# Patient Record
Sex: MALE | Race: WHITE | NOT HISPANIC OR LATINO | Employment: PART TIME | URBAN - METROPOLITAN AREA
[De-identification: names, ages, dates, MRNs, and addresses within clinical notes are randomized per-mention and may not be internally consistent; named-entity substitution may affect disease eponyms.]

---

## 2017-03-27 ENCOUNTER — APPOINTMENT (OUTPATIENT)
Dept: GENERAL RADIOLOGY | Age: 67
End: 2017-03-27
Attending: EMERGENCY MEDICINE

## 2017-03-27 ENCOUNTER — HOSPITAL ENCOUNTER (EMERGENCY)
Age: 67
Discharge: ANOTHER HOSPITAL | End: 2017-03-27
Attending: EMERGENCY MEDICINE

## 2017-03-27 VITALS
RESPIRATION RATE: 14 BRPM | WEIGHT: 198.85 LBS | SYSTOLIC BLOOD PRESSURE: 100 MMHG | TEMPERATURE: 97.1 F | OXYGEN SATURATION: 95 % | HEIGHT: 72 IN | BODY MASS INDEX: 26.93 KG/M2 | HEART RATE: 97 BPM | DIASTOLIC BLOOD PRESSURE: 62 MMHG

## 2017-03-27 DIAGNOSIS — S72.001A CLOSED RIGHT HIP FRACTURE, INITIAL ENCOUNTER (CMD): Primary | ICD-10-CM

## 2017-03-27 LAB
ABO + RH BLD: NORMAL
ANION GAP SERPL CALC-SCNC: 14 MMOL/L (ref 10–20)
BASOPHILS # BLD AUTO: 0 K/MCL (ref 0–0.3)
BASOPHILS NFR BLD AUTO: 0 %
BLD GP AB SCN SERPL QL: NEGATIVE
BLOOD BANK CMNT PATIENT-IMP: NORMAL
BUN SERPL-MCNC: 12 MG/DL (ref 6–20)
BUN/CREAT SERPL: 14 (ref 7–25)
CALCIUM SERPL-MCNC: 9.2 MG/DL (ref 8.4–10.2)
CHLORIDE SERPL-SCNC: 99 MMOL/L (ref 98–107)
CO2 SERPL-SCNC: 25 MMOL/L (ref 21–32)
CREAT SERPL-MCNC: 0.83 MG/DL (ref 0.67–1.17)
CROSSMATCH EXPIRE: NORMAL
DIFFERENTIAL METHOD BLD: ABNORMAL
EOSINOPHIL # BLD AUTO: 0 K/MCL (ref 0.1–0.5)
EOSINOPHIL NFR SPEC: 1 %
ERYTHROCYTE [DISTWIDTH] IN BLOOD: 14.5 % (ref 11–15)
ETHANOL SERPL-MCNC: 181 MG/DL
GLUCOSE SERPL-MCNC: 111 MG/DL (ref 65–99)
HCT VFR BLD CALC: 41.4 % (ref 39–51)
HGB BLD-MCNC: 14 G/DL (ref 13–17)
LYMPHOCYTES # BLD MANUAL: 1.7 K/MCL (ref 1–4)
LYMPHOCYTES NFR BLD MANUAL: 21 %
MCH RBC QN AUTO: 30.5 PG (ref 26–34)
MCHC RBC AUTO-ENTMCNC: 33.8 G/DL (ref 32–36.5)
MCV RBC AUTO: 90.2 FL (ref 78–100)
MONOCYTES # BLD MANUAL: 0.5 K/MCL (ref 0.3–0.9)
MONOCYTES NFR BLD MANUAL: 6 %
NEUTROPHILS # BLD AUTO: 6 K/MCL (ref 1.8–7.7)
NEUTROPHILS NFR BLD AUTO: 72 %
PLATELET # BLD: 307 K/MCL (ref 140–450)
POTASSIUM SERPL-SCNC: 4.2 MMOL/L (ref 3.4–5.1)
RBC # BLD: 4.59 MIL/MCL (ref 4.5–5.9)
SODIUM SERPL-SCNC: 134 MMOL/L (ref 135–145)
WBC # BLD: 8.2 K/MCL (ref 4.2–11)

## 2017-03-27 PROCEDURE — 99284 EMERGENCY DEPT VISIT MOD MDM: CPT

## 2017-03-27 PROCEDURE — 10002800 HB RX 250 W HCPCS: Performed by: EMERGENCY MEDICINE

## 2017-03-27 PROCEDURE — 96376 TX/PRO/DX INJ SAME DRUG ADON: CPT

## 2017-03-27 PROCEDURE — 86901 BLOOD TYPING SEROLOGIC RH(D): CPT

## 2017-03-27 PROCEDURE — 80320 DRUG SCREEN QUANTALCOHOLS: CPT

## 2017-03-27 PROCEDURE — 85025 COMPLETE CBC W/AUTO DIFF WBC: CPT

## 2017-03-27 PROCEDURE — 36415 COLL VENOUS BLD VENIPUNCTURE: CPT

## 2017-03-27 PROCEDURE — 72170 X-RAY EXAM OF PELVIS: CPT

## 2017-03-27 PROCEDURE — 73502 X-RAY EXAM HIP UNI 2-3 VIEWS: CPT | Performed by: RADIOLOGY

## 2017-03-27 PROCEDURE — 73502 X-RAY EXAM HIP UNI 2-3 VIEWS: CPT

## 2017-03-27 PROCEDURE — 80048 BASIC METABOLIC PNL TOTAL CA: CPT

## 2017-03-27 PROCEDURE — 96374 THER/PROPH/DIAG INJ IV PUSH: CPT

## 2017-03-27 RX ORDER — HYDROMORPHONE HYDROCHLORIDE 1 MG/ML
1 INJECTION, SOLUTION INTRAMUSCULAR; INTRAVENOUS; SUBCUTANEOUS EVERY 30 MIN PRN
Status: DISCONTINUED | OUTPATIENT
Start: 2017-03-27 | End: 2017-03-27 | Stop reason: HOSPADM

## 2017-03-27 RX ORDER — HYDROMORPHONE HYDROCHLORIDE 1 MG/ML
0.5 INJECTION, SOLUTION INTRAMUSCULAR; INTRAVENOUS; SUBCUTANEOUS EVERY 30 MIN PRN
Status: DISCONTINUED | OUTPATIENT
Start: 2017-03-27 | End: 2017-03-27

## 2017-03-27 RX ADMIN — HYDROMORPHONE HYDROCHLORIDE 1 MG: 1 INJECTION, SOLUTION INTRAMUSCULAR; INTRAVENOUS; SUBCUTANEOUS at 05:07

## 2017-03-27 RX ADMIN — HYDROMORPHONE HYDROCHLORIDE 0.5 MG: 1 INJECTION, SOLUTION INTRAMUSCULAR; INTRAVENOUS; SUBCUTANEOUS at 04:13

## 2017-03-27 ASSESSMENT — PAIN SCALES - GENERAL
PAIN_LEVEL_AT_REST: 5
PAINLEVEL_OUTOF10: 3
PAIN_LEVEL_AT_REST: 6
PAINLEVEL_OUTOF10: 5

## 2017-03-27 ASSESSMENT — ENCOUNTER SYMPTOMS
FEVER: 0
VOMITING: 0
ABDOMINAL PAIN: 0
HEADACHES: 0
NAUSEA: 0
WEAKNESS: 0
SORE THROAT: 0
BACK PAIN: 0
SHORTNESS OF BREATH: 0
SLEEP DISTURBANCE: 0
DIARRHEA: 0

## 2020-06-01 ENCOUNTER — LAB REQUISITION (OUTPATIENT)
Dept: LAB | Age: 70
End: 2020-06-01

## 2020-06-01 DIAGNOSIS — I10 ESSENTIAL (PRIMARY) HYPERTENSION: ICD-10-CM

## 2020-06-01 PROCEDURE — 82043 UR ALBUMIN QUANTITATIVE: CPT | Performed by: CLINICAL MEDICAL LABORATORY

## 2020-06-01 PROCEDURE — 82570 ASSAY OF URINE CREATININE: CPT | Performed by: CLINICAL MEDICAL LABORATORY

## 2020-06-01 PROCEDURE — PSEU8567 MICROALBUMIN URINE RANDOM: Performed by: CLINICAL MEDICAL LABORATORY

## 2020-06-02 LAB
CREAT UR-MCNC: 174 MG/DL
MICROALBUMIN UR-MCNC: 0.73 MCG/MIN
MICROALBUMIN/CREAT UR: 4.2 MG/G

## 2021-01-11 ENCOUNTER — APPOINTMENT (OUTPATIENT)
Dept: CT IMAGING | Facility: CLINIC | Age: 71
End: 2021-01-11
Attending: EMERGENCY MEDICINE
Payer: MEDICARE

## 2021-01-11 ENCOUNTER — HOSPITAL ENCOUNTER (OUTPATIENT)
Facility: CLINIC | Age: 71
Setting detail: OBSERVATION
Discharge: HOME | End: 2021-01-12
Attending: EMERGENCY MEDICINE | Admitting: INTERNAL MEDICINE
Payer: MEDICARE

## 2021-01-11 DIAGNOSIS — I10 BENIGN ESSENTIAL HYPERTENSION: Primary | ICD-10-CM

## 2021-01-11 DIAGNOSIS — D64.9 ANEMIA, UNSPECIFIED TYPE: ICD-10-CM

## 2021-01-11 DIAGNOSIS — R55 NEAR SYNCOPE: ICD-10-CM

## 2021-01-11 DIAGNOSIS — I95.89 OTHER SPECIFIED HYPOTENSION: ICD-10-CM

## 2021-01-11 LAB
ALBUMIN SERPL-MCNC: 2.9 G/DL (ref 3.4–5)
ALP SERPL-CCNC: 103 U/L (ref 40–150)
ALT SERPL W P-5'-P-CCNC: 20 U/L (ref 0–70)
ANION GAP SERPL CALCULATED.3IONS-SCNC: 5 MMOL/L (ref 3–14)
AST SERPL W P-5'-P-CCNC: 13 U/L (ref 0–45)
BASOPHILS # BLD AUTO: 0.1 10E9/L (ref 0–0.2)
BASOPHILS NFR BLD AUTO: 0.7 %
BILIRUB SERPL-MCNC: 0.4 MG/DL (ref 0.2–1.3)
BUN SERPL-MCNC: 17 MG/DL (ref 7–30)
CALCIUM SERPL-MCNC: 8.6 MG/DL (ref 8.5–10.1)
CHLORIDE SERPL-SCNC: 108 MMOL/L (ref 94–109)
CO2 BLDCOV-SCNC: 22 MMOL/L (ref 21–28)
CO2 SERPL-SCNC: 24 MMOL/L (ref 20–32)
CREAT BLD-MCNC: 1.1 MG/DL (ref 0.66–1.25)
CREAT SERPL-MCNC: 1.1 MG/DL (ref 0.66–1.25)
D DIMER PPP FEU-MCNC: 0.5 UG/ML FEU (ref 0–0.5)
DIFFERENTIAL METHOD BLD: ABNORMAL
EOSINOPHIL # BLD AUTO: 0.2 10E9/L (ref 0–0.7)
EOSINOPHIL NFR BLD AUTO: 2.6 %
ERYTHROCYTE [DISTWIDTH] IN BLOOD BY AUTOMATED COUNT: 14.4 % (ref 10–15)
GFR SERPL CREATININE-BSD FRML MDRD: 66 ML/MIN/{1.73_M2}
GFR SERPL CREATININE-BSD FRML MDRD: 68 ML/MIN/{1.73_M2}
GLUCOSE SERPL-MCNC: 158 MG/DL (ref 70–99)
HCT VFR BLD AUTO: 39 % (ref 40–53)
HCT VFR BLD CALC: 37 %PCV (ref 40–53)
HGB BLD CALC-MCNC: 12.6 G/DL (ref 13.3–17.7)
HGB BLD-MCNC: 12.1 G/DL (ref 13.3–17.7)
IMM GRANULOCYTES # BLD: 0.1 10E9/L (ref 0–0.4)
IMM GRANULOCYTES NFR BLD: 0.7 %
INTERPRETATION ECG - MUSE: NORMAL
LABORATORY COMMENT REPORT: NORMAL
LACTATE BLD-SCNC: 2 MMOL/L (ref 0.7–2)
LYMPHOCYTES # BLD AUTO: 1.2 10E9/L (ref 0.8–5.3)
LYMPHOCYTES NFR BLD AUTO: 15.1 %
MAGNESIUM SERPL-MCNC: 2.1 MG/DL (ref 1.6–2.3)
MCH RBC QN AUTO: 27.6 PG (ref 26.5–33)
MCHC RBC AUTO-ENTMCNC: 31 G/DL (ref 31.5–36.5)
MCV RBC AUTO: 89 FL (ref 78–100)
MONOCYTES # BLD AUTO: 0.6 10E9/L (ref 0–1.3)
MONOCYTES NFR BLD AUTO: 7.6 %
NEUTROPHILS # BLD AUTO: 6 10E9/L (ref 1.6–8.3)
NEUTROPHILS NFR BLD AUTO: 73.3 %
NRBC # BLD AUTO: 0 10*3/UL
NRBC BLD AUTO-RTO: 0 /100
NT-PROBNP SERPL-MCNC: 170 PG/ML (ref 0–900)
PCO2 BLDV: 43 MM HG (ref 40–50)
PH BLDV: 7.31 PH (ref 7.32–7.43)
PLATELET # BLD AUTO: 398 10E9/L (ref 150–450)
PO2 BLDV: 29 MM HG (ref 25–47)
POTASSIUM BLD-SCNC: 4.3 MMOL/L (ref 3.4–5.3)
POTASSIUM SERPL-SCNC: 4.2 MMOL/L (ref 3.4–5.3)
PROT SERPL-MCNC: 6.7 G/DL (ref 6.8–8.8)
RBC # BLD AUTO: 4.38 10E12/L (ref 4.4–5.9)
SAO2 % BLDV FROM PO2: 50 %
SARS-COV-2 RNA RESP QL NAA+PROBE: NEGATIVE
SODIUM BLD-SCNC: 139 MMOL/L (ref 133–144)
SODIUM SERPL-SCNC: 137 MMOL/L (ref 133–144)
SPECIMEN SOURCE: NORMAL
TROPONIN I SERPL-MCNC: <0.015 UG/L (ref 0–0.04)
TSH SERPL DL<=0.005 MIU/L-ACNC: 2.97 MU/L (ref 0.4–4)
WBC # BLD AUTO: 8.1 10E9/L (ref 4–11)

## 2021-01-11 PROCEDURE — 93005 ELECTROCARDIOGRAM TRACING: CPT

## 2021-01-11 PROCEDURE — 250N000011 HC RX IP 250 OP 636: Performed by: EMERGENCY MEDICINE

## 2021-01-11 PROCEDURE — 87635 SARS-COV-2 COVID-19 AMP PRB: CPT | Performed by: EMERGENCY MEDICINE

## 2021-01-11 PROCEDURE — 999N001079 HC STATISTIC HEMATOCRIT ED POCT

## 2021-01-11 PROCEDURE — 250N000013 HC RX MED GY IP 250 OP 250 PS 637: Performed by: PHYSICIAN ASSISTANT

## 2021-01-11 PROCEDURE — 70450 CT HEAD/BRAIN W/O DYE: CPT

## 2021-01-11 PROCEDURE — 83880 ASSAY OF NATRIURETIC PEPTIDE: CPT | Mod: GZ | Performed by: EMERGENCY MEDICINE

## 2021-01-11 PROCEDURE — 96360 HYDRATION IV INFUSION INIT: CPT

## 2021-01-11 PROCEDURE — 80053 COMPREHEN METABOLIC PANEL: CPT | Performed by: EMERGENCY MEDICINE

## 2021-01-11 PROCEDURE — 84443 ASSAY THYROID STIM HORMONE: CPT | Performed by: EMERGENCY MEDICINE

## 2021-01-11 PROCEDURE — 250N000009 HC RX 250: Performed by: EMERGENCY MEDICINE

## 2021-01-11 PROCEDURE — 85379 FIBRIN DEGRADATION QUANT: CPT | Performed by: EMERGENCY MEDICINE

## 2021-01-11 PROCEDURE — G0378 HOSPITAL OBSERVATION PER HR: HCPCS

## 2021-01-11 PROCEDURE — 83735 ASSAY OF MAGNESIUM: CPT | Performed by: EMERGENCY MEDICINE

## 2021-01-11 PROCEDURE — 36415 COLL VENOUS BLD VENIPUNCTURE: CPT | Performed by: PHYSICIAN ASSISTANT

## 2021-01-11 PROCEDURE — 82565 ASSAY OF CREATININE: CPT | Mod: 91

## 2021-01-11 PROCEDURE — 999N001077 HC STATISTIC POTASSIUM ED POCT

## 2021-01-11 PROCEDURE — 84484 ASSAY OF TROPONIN QUANT: CPT | Performed by: PHYSICIAN ASSISTANT

## 2021-01-11 PROCEDURE — 84484 ASSAY OF TROPONIN QUANT: CPT | Performed by: EMERGENCY MEDICINE

## 2021-01-11 PROCEDURE — 250N000013 HC RX MED GY IP 250 OP 250 PS 637: Performed by: INTERNAL MEDICINE

## 2021-01-11 PROCEDURE — 96361 HYDRATE IV INFUSION ADD-ON: CPT

## 2021-01-11 PROCEDURE — 999N001076 HC STATISTIC SODIUM ED POCT

## 2021-01-11 PROCEDURE — 99219 PR INITIAL OBSERVATION CARE,LEVEL II: CPT | Performed by: PHYSICIAN ASSISTANT

## 2021-01-11 PROCEDURE — 99291 CRITICAL CARE FIRST HOUR: CPT | Mod: 25

## 2021-01-11 PROCEDURE — 258N000003 HC RX IP 258 OP 636: Performed by: PHYSICIAN ASSISTANT

## 2021-01-11 PROCEDURE — 83605 ASSAY OF LACTIC ACID: CPT | Performed by: EMERGENCY MEDICINE

## 2021-01-11 PROCEDURE — 82803 BLOOD GASES ANY COMBINATION: CPT

## 2021-01-11 PROCEDURE — 85025 COMPLETE CBC W/AUTO DIFF WBC: CPT | Performed by: EMERGENCY MEDICINE

## 2021-01-11 PROCEDURE — C9803 HOPD COVID-19 SPEC COLLECT: HCPCS

## 2021-01-11 PROCEDURE — 258N000003 HC RX IP 258 OP 636: Performed by: EMERGENCY MEDICINE

## 2021-01-11 PROCEDURE — 71275 CT ANGIOGRAPHY CHEST: CPT

## 2021-01-11 RX ORDER — ALBUTEROL SULFATE 90 UG/1
2 AEROSOL, METERED RESPIRATORY (INHALATION) EVERY 4 HOURS PRN
COMMUNITY

## 2021-01-11 RX ORDER — VENLAFAXINE HYDROCHLORIDE 150 MG/1
150 CAPSULE, EXTENDED RELEASE ORAL DAILY
Status: DISCONTINUED | OUTPATIENT
Start: 2021-01-11 | End: 2021-01-12 | Stop reason: HOSPADM

## 2021-01-11 RX ORDER — TAMSULOSIN HYDROCHLORIDE 0.4 MG/1
0.4 CAPSULE ORAL EVERY EVENING
COMMUNITY

## 2021-01-11 RX ORDER — TOPIRAMATE SPINKLE 25 MG/1
25 CAPSULE ORAL DAILY
Status: DISCONTINUED | OUTPATIENT
Start: 2021-01-11 | End: 2021-01-11

## 2021-01-11 RX ORDER — NITROGLYCERIN 0.4 MG/1
0.4 TABLET SUBLINGUAL EVERY 5 MIN PRN
Status: DISCONTINUED | OUTPATIENT
Start: 2021-01-11 | End: 2021-01-12 | Stop reason: HOSPADM

## 2021-01-11 RX ORDER — SULFAMETHOXAZOLE/TRIMETHOPRIM 800-160 MG
1 TABLET ORAL DAILY
Status: DISCONTINUED | OUTPATIENT
Start: 2021-01-11 | End: 2021-01-12 | Stop reason: HOSPADM

## 2021-01-11 RX ORDER — FEXOFENADINE HCL 60 MG/1
30 TABLET, FILM COATED ORAL DAILY
COMMUNITY

## 2021-01-11 RX ORDER — TAMSULOSIN HYDROCHLORIDE 0.4 MG/1
0.4 CAPSULE ORAL EVERY EVENING
Status: DISCONTINUED | OUTPATIENT
Start: 2021-01-11 | End: 2021-01-12 | Stop reason: HOSPADM

## 2021-01-11 RX ORDER — SULFAMETHOXAZOLE/TRIMETHOPRIM 800-160 MG
1 TABLET ORAL DAILY
COMMUNITY

## 2021-01-11 RX ORDER — IMIPRAMINE HCL 25 MG
100 TABLET ORAL AT BEDTIME
Status: DISCONTINUED | OUTPATIENT
Start: 2021-01-11 | End: 2021-01-12 | Stop reason: HOSPADM

## 2021-01-11 RX ORDER — VENLAFAXINE HYDROCHLORIDE 75 MG/1
150 CAPSULE, EXTENDED RELEASE ORAL DAILY
COMMUNITY

## 2021-01-11 RX ORDER — OMEPRAZOLE 40 MG/1
40 CAPSULE, DELAYED RELEASE ORAL
COMMUNITY

## 2021-01-11 RX ORDER — LIDOCAINE 40 MG/G
CREAM TOPICAL
Status: DISCONTINUED | OUTPATIENT
Start: 2021-01-11 | End: 2021-01-12 | Stop reason: HOSPADM

## 2021-01-11 RX ORDER — IBUPROFEN 200 MG
200 TABLET ORAL EVERY 6 HOURS PRN
Status: DISCONTINUED | OUTPATIENT
Start: 2021-01-11 | End: 2021-01-12 | Stop reason: HOSPADM

## 2021-01-11 RX ORDER — ATORVASTATIN CALCIUM 40 MG/1
40 TABLET, FILM COATED ORAL AT BEDTIME
COMMUNITY

## 2021-01-11 RX ORDER — AMOXICILLIN 250 MG
1 CAPSULE ORAL 2 TIMES DAILY PRN
COMMUNITY

## 2021-01-11 RX ORDER — IMIPRAMINE HCL 50 MG
100 TABLET ORAL AT BEDTIME
COMMUNITY

## 2021-01-11 RX ORDER — FLUTICASONE PROPIONATE 50 MCG
1 SPRAY, SUSPENSION (ML) NASAL DAILY
COMMUNITY

## 2021-01-11 RX ORDER — ATENOLOL 25 MG/1
25 TABLET ORAL DAILY
Status: DISCONTINUED | OUTPATIENT
Start: 2021-01-11 | End: 2021-01-12 | Stop reason: HOSPADM

## 2021-01-11 RX ORDER — ALBUTEROL SULFATE 90 UG/1
2 AEROSOL, METERED RESPIRATORY (INHALATION) EVERY 4 HOURS PRN
Status: DISCONTINUED | OUTPATIENT
Start: 2021-01-11 | End: 2021-01-12 | Stop reason: HOSPADM

## 2021-01-11 RX ORDER — ONDANSETRON 2 MG/ML
4 INJECTION INTRAMUSCULAR; INTRAVENOUS EVERY 6 HOURS PRN
Status: DISCONTINUED | OUTPATIENT
Start: 2021-01-11 | End: 2021-01-12 | Stop reason: HOSPADM

## 2021-01-11 RX ORDER — TOPIRAMATE 25 MG/1
25 TABLET, FILM COATED ORAL DAILY
Status: DISCONTINUED | OUTPATIENT
Start: 2021-01-11 | End: 2021-01-12 | Stop reason: HOSPADM

## 2021-01-11 RX ORDER — LIDOCAINE 4 G/G
1 PATCH TOPICAL DAILY PRN
COMMUNITY

## 2021-01-11 RX ORDER — SODIUM CHLORIDE 9 MG/ML
INJECTION, SOLUTION INTRAVENOUS CONTINUOUS
Status: DISCONTINUED | OUTPATIENT
Start: 2021-01-11 | End: 2021-01-12 | Stop reason: HOSPADM

## 2021-01-11 RX ORDER — ATENOLOL 25 MG/1
25 TABLET ORAL DAILY
Status: ON HOLD | COMMUNITY
End: 2021-01-12

## 2021-01-11 RX ORDER — ASPIRIN 81 MG/1
81 TABLET ORAL DAILY
Status: DISCONTINUED | OUTPATIENT
Start: 2021-01-11 | End: 2021-01-12 | Stop reason: HOSPADM

## 2021-01-11 RX ORDER — ONDANSETRON 4 MG/1
4 TABLET, ORALLY DISINTEGRATING ORAL EVERY 6 HOURS PRN
Status: DISCONTINUED | OUTPATIENT
Start: 2021-01-11 | End: 2021-01-12 | Stop reason: HOSPADM

## 2021-01-11 RX ORDER — IBUPROFEN 200 MG
200 TABLET ORAL EVERY 6 HOURS PRN
COMMUNITY

## 2021-01-11 RX ORDER — ACETAMINOPHEN 650 MG/1
650 SUPPOSITORY RECTAL EVERY 4 HOURS PRN
Status: DISCONTINUED | OUTPATIENT
Start: 2021-01-11 | End: 2021-01-12 | Stop reason: HOSPADM

## 2021-01-11 RX ORDER — ASPIRIN 81 MG/1
81 TABLET ORAL DAILY
COMMUNITY

## 2021-01-11 RX ORDER — TOPIRAMATE SPINKLE 25 MG/1
25 CAPSULE ORAL DAILY
COMMUNITY

## 2021-01-11 RX ORDER — IOPAMIDOL 755 MG/ML
68 INJECTION, SOLUTION INTRAVASCULAR ONCE
Status: COMPLETED | OUTPATIENT
Start: 2021-01-11 | End: 2021-01-11

## 2021-01-11 RX ORDER — BETAMETHASONE DIPROPIONATE 0.5 MG/G
LOTION TOPICAL DAILY PRN
COMMUNITY

## 2021-01-11 RX ORDER — SODIUM CHLORIDE 9 MG/ML
INJECTION, SOLUTION INTRAVENOUS CONTINUOUS
Status: DISCONTINUED | OUTPATIENT
Start: 2021-01-11 | End: 2021-01-11

## 2021-01-11 RX ORDER — NITROGLYCERIN 0.4 MG/1
0.4 TABLET SUBLINGUAL EVERY 5 MIN PRN
COMMUNITY

## 2021-01-11 RX ORDER — MULTIPLE VITAMINS W/ MINERALS TAB 9MG-400MCG
1 TAB ORAL DAILY
COMMUNITY

## 2021-01-11 RX ORDER — ACETAMINOPHEN 325 MG/1
650 TABLET ORAL EVERY 4 HOURS PRN
Status: DISCONTINUED | OUTPATIENT
Start: 2021-01-11 | End: 2021-01-12 | Stop reason: HOSPADM

## 2021-01-11 RX ADMIN — IOPAMIDOL 68 ML: 755 INJECTION, SOLUTION INTRAVENOUS at 09:14

## 2021-01-11 RX ADMIN — SODIUM CHLORIDE 92 ML: 9 INJECTION, SOLUTION INTRAVENOUS at 09:14

## 2021-01-11 RX ADMIN — SODIUM CHLORIDE 1000 ML: 9 INJECTION, SOLUTION INTRAVENOUS at 09:02

## 2021-01-11 RX ADMIN — SODIUM CHLORIDE: 9 INJECTION, SOLUTION INTRAVENOUS at 13:32

## 2021-01-11 RX ADMIN — IMIPRAMINE HYDROCHLORIDE 100 MG: 25 TABLET ORAL at 21:45

## 2021-01-11 RX ADMIN — TOPIRAMATE 25 MG: 25 TABLET, FILM COATED ORAL at 14:54

## 2021-01-11 RX ADMIN — TAMSULOSIN HYDROCHLORIDE 0.4 MG: 0.4 CAPSULE ORAL at 19:50

## 2021-01-11 RX ADMIN — SODIUM CHLORIDE: 9 INJECTION, SOLUTION INTRAVENOUS at 23:57

## 2021-01-11 RX ADMIN — VENLAFAXINE HYDROCHLORIDE 150 MG: 150 CAPSULE, EXTENDED RELEASE ORAL at 13:33

## 2021-01-11 RX ADMIN — SULFAMETHOXAZOLE AND TRIMETHOPRIM 1 TABLET: 800; 160 TABLET ORAL at 13:33

## 2021-01-11 ASSESSMENT — ENCOUNTER SYMPTOMS
WEAKNESS: 1
HEADACHES: 1
NAUSEA: 0
BLOOD IN STOOL: 0
LIGHT-HEADEDNESS: 1
SHORTNESS OF BREATH: 0
FEVER: 0
ABDOMINAL PAIN: 0
DIARRHEA: 0
CHILLS: 0
COUGH: 0
DIAPHORESIS: 0

## 2021-01-11 ASSESSMENT — MIFFLIN-ST. JEOR
SCORE: 1635.35
SCORE: 1618.88

## 2021-01-11 NOTE — PHARMACY-ADMISSION MEDICATION HISTORY
Pharmacy Medication History  Admission medication history interview status for the 1/11/2021  admission is complete. See EPIC admission navigator for prior to admission medications       Medication history sources: Patient, [facility] everywhere  Location of interview: Phone  Adherence Assessment: Good    Significant changes made to the medication list:  Added everything below      Additional medication history information:   n/a    Medication reconciliation completed by provider prior to medication history? No    Time spent in this activity: 15 min      Prior to Admission medications    Medication Sig Last Dose Taking? Auth Provider   albuterol (PROAIR HFA/PROVENTIL HFA/VENTOLIN HFA) 108 (90 Base) MCG/ACT inhaler Inhale 2 puffs into the lungs every 4 hours as needed for shortness of breath / dyspnea or wheezing prn Yes Unknown, Entered By History   aspirin 81 MG EC tablet Take 81 mg by mouth daily 1/10/2021 at Unknown time Yes Unknown, Entered By History   atenolol (TENORMIN) 25 MG tablet Take 25 mg by mouth daily For arteriosclerotic cardiovascular disease 1/10/2021 at Unknown time Yes Unknown, Entered By History   atorvastatin (LIPITOR) 40 MG tablet Take 40 mg by mouth At Bedtime 1/10/2021 at Unknown time Yes Unknown, Entered By History   betamethasone dipropionate (DIPROSONE) 0.05 % external lotion Apply topically daily as needed (to scalp) prn Yes Unknown, Entered By History   fexofenadine (ALLEGRA) 60 MG tablet Take 30 mg by mouth daily Allergic rhinitis 1/10/2021 at Unknown time Yes Unknown, Entered By History   fluticasone (FLONASE) 50 MCG/ACT nasal spray Spray 1 spray into both nostrils daily 1/10/2021 at Unknown time Yes Unknown, Entered By History   ibuprofen (ADVIL/MOTRIN) 200 MG tablet Take 200 mg by mouth every 6 hours as needed for mild pain prn Yes Unknown, Entered By History   imipramine (TOFRANIL) 50 MG tablet Take 100 mg by mouth At Bedtime For acquired polyneuropathy 1/10/2021 at Unknown time  Yes Unknown, Entered By History   Lidocaine (LIDOCARE) 4 % Patch Place 1 patch onto the skin daily as needed for moderate pain (to upper back) To prevent lidocaine toxicity, patient should be patch free for 12 hrs daily. prn Yes Unknown, Entered By History   multivitamin w/minerals (THERA-VIT-M) tablet Take 1 tablet by mouth daily 1/10/2021 at Unknown time Yes Unknown, Entered By History   nitroGLYcerin (NITROSTAT) 0.4 MG sublingual tablet Place 0.4 mg under the tongue every 5 minutes as needed for chest pain For chest pain place 1 tablet under the tongue every 5 minutes for 3 doses. If symptoms persist 5 minutes after 1st dose call 911. prn Yes Unknown, Entered By History   omeprazole (PRILOSEC) 40 MG DR capsule Take 40 mg by mouth daily before breakfast For GERD 1/10/2021 at Unknown time Yes Unknown, Entered By History   senna-docusate (SENOKOT-S/PERICOLACE) 8.6-50 MG tablet Take 1 tablet by mouth 2 times daily as needed for constipation prn Yes Unknown, Entered By History   sulfamethoxazole-trimethoprim (BACTRIM DS) 800-160 MG tablet Take 1 tablet by mouth daily X 30 days for acne vulgaris, starting 12/21/2020 1/10/2021 at Unknown time Yes Unknown, Entered By History   tamsulosin (FLOMAX) 0.4 MG capsule Take 0.4 mg by mouth every evening For BPH 1/10/2021 at Unknown time Yes Unknown, Entered By History   topiramate (TOPAMAX) 25 MG capsule Take 25 mg by mouth daily For acquired polyneuropathy 1/10/2021 at Unknown time Yes Unknown, Entered By History   venlafaxine (EFFEXOR-XR) 75 MG 24 hr capsule Take 150 mg by mouth daily Peripheral sensory neuyropathy 1/10/2021 at Unknown time Yes Unknown, Entered By History     Kami Sawant, PharmD      The information provided in this note is only as accurate as the sources available at the time of the update(s).

## 2021-01-11 NOTE — ED PROVIDER NOTES
History   Chief Complaint:  Lightheadedness and Weakness    HPI   Saad Renee is a 70 year old male, with a history of ASHD, pulmonary embolism (thought to be provoked after heparin initiation after bowel resection surgery), hypertension, polyneuropathy, and GERD, who presents to the ED for evaluation of lightheadedness and weakness. The patient states he has had some ongoing lightheadedness and weakness for the past three days. He states he has had some intermittent, dull chest pain, with some minor sharp episodes every so often. The patient has not felt short of breath nor has he been nauseous. The patient mentions he had a headache beginning three days ago as well, which he felt as his normal migraines. However, this morning, the patient was walking and apparently a bystander had to catch him as he was falling, therefore, EMS was contacted and the patient was brought in. En route, the paramedics state the patient had a blood pressure of 70/40s initially, and after a full liter, the patient had a blood pressure of 95/50. When they recorded on their monitor and EKG strips they found the patient to have ST depression, but no other cardiac abnormalities. He was given 324 mg aspirin prior to arrival. Here in the emergency department, the patient says he has been feeling lightheaded for a some time now, and per Epic review, was seen one week ago at his primary and had his Effexor dose lowered to 150 mg per day. He has not had any other medication or life changes. The patient denies any nausea, vomiting, or diarrhea. He has not had any abdominal pain or bloody stool. The patient has not had any fever, chills, diaphoresis, or cough. He also denies any palpitations. The patient has had a history of pulmonary embolism, but is not on long term anticoagulation.    Allergies:  Heparin  Lisinopril    Medications:   "  Aspirin  Lipitor  Atenolol  Imipramine  Prilosec  Flomax  Topiramate  Albuterol  Sennosides-docusate  Effexor  Bactrim    Past Medical History:    Myelopathy  BPH  Depression  ASHD  Dyslipidemia  Pulmonary embolism  GERD  Polyneuropathy  Hypertension  CAD    Past Surgical History:    Bilateral knee arthroplasty  Right hip arthroplasty  Left shoulder arthroplasty  Hernia repair  Bowel resection  Lumbar laminectomy    Family History:    The patient denies past family history.     Social History:  Smoking status: No  Alcohol use: Occasionally  Presents to the ED via EMS    Review of Systems   Constitutional: Negative for chills, diaphoresis and fever.   Respiratory: Negative for cough and shortness of breath.    Cardiovascular: Positive for chest pain.   Gastrointestinal: Negative for abdominal pain, blood in stool, diarrhea and nausea.   Neurological: Positive for weakness, light-headedness and headaches.   All other systems reviewed and are negative.    Physical Exam     Patient Vitals for the past 24 hrs:   BP Temp Temp src Pulse Resp SpO2 Height Weight   01/11/21 1030 122/86 -- -- 68 -- -- -- --   01/11/21 1015 120/76 -- -- 69 14 98 % -- --   01/11/21 1000 115/75 -- -- 67 16 98 % -- --   01/11/21 0946 -- -- -- 66 16 98 % -- --   01/11/21 0945 111/77 -- -- 67 9 -- -- --   01/11/21 0930 109/70 -- -- 68 11 97 % -- --   01/11/21 0928 109/70 -- -- 70 -- 98 % -- --   01/11/21 0900 99/68 -- -- 72 14 (!) 89 % -- --   01/11/21 0848 93/68 98.2  F (36.8  C) Oral 75 12 98 % 1.854 m (6' 1\") 81.6 kg (180 lb)       Physical Exam  General: Well-nourished, appears pale and fatigued   eyes: PERRL, conjunctivae pink no scleral icterus or conjunctival injection  ENT:  Moist mucus membranes, posterior oropharynx clear without erythema or exudates  Respiratory:  Lungs clear to auscultation bilaterally, no crackles/rubs/wheezes.  Good air movement  CV: Normal rate and rhythm, no murmurs/rubs/gallops.  Cool strategies  GI:  Abdomen soft " and non-distended.  Normoactive BS.  No tenderness, guarding or rebound  Skin: Warm, dry.  No rashes or petechiae  Musculoskeletal: No peripheral edema or calf tenderness  Neuro: Alert and oriented to person/place/time. PERRL, EOMI no nystagmus, no aphasia/facial droop/dysarthria, tongue midline, symmetric palatal elevation, normal strength at SCM/trapezius/BUE/BLE, normal coordination to FNF at BUE,  deferred gait, negative romberg, sensation intact to LT over face/BUE/BLE  Psychiatric: Normal affect    Emergency Department Course   ECG (08:45:06):  Rate 74 bpm. DC interval 150. QRS duration 148. QT/QTc 424/470. P-R-T axes 55 94 57. Normal sinus rhythm. Nonspecific intraventricular block. Possible right ventricular hypertrophy. [place]normal ECG[place]ed [place] [address] by Breanne Rangel MD.    Imaging:    CT Head without contrast:  No evidence of acute intracranial hemorrhage, mass, or   herniation.     CT Chest PE with contrast:  1.  No evidence of pulmonary embolism or other acute chest   abnormality.   2.  Patulous esophagus could be related to achalasia.     Imaging independently reviewed and agree with radiologist interpretation.    Laboratory:    CBC: HGB 12.1 (L), o/w WNL (WBC 8.1, )    CMP:  (H), Albumin 2.9 (L), Protein Total 6.7 (L), o/w WNL (Creatinine 1.10)    Creatinine POCT (Collected 0851): Creatinine 1.1, GFR 66    Troponin (Collected 0850): <0.015    D-dimer: 0.5    BNP: 170    Lactic Acid (0913): 2.0    TSH with free T4 reflex: 2.97    Magnesium: 2.1     ISTAT Gases electrolyte venous POCT: pH 7.31 (L), pCO2 43, pO2 29, Bicarbonate 22, O2 Sat 50, Na 139, K 4.3, HGB 12.6 (L), Hematocrit 37 (L)    Influenza A/B & SARS-CoV2 (COVID-19) Virus PCR Multiplex: Pending    Interventions:  0902: NS 1L IV Bolus     Emergency Department Course:  Reviewed:  I reviewed the patient's nursing notes, vitals, and available past medical records.     Assessments:  0839: I assessed the patient and performed an exam  as detailed above.    1000: I rechecked the patient. Explained findings to patient.    1045: I spoke to the patient's daughter and updated her on the patient's condition and work up.    Consults:   1130: I discussed the patient with the admitting hospitalist, Dr. Quevedo    Disposition:  The patient was admitted to the hospital under the care of Dr. Quevedo.     Impression & Plan   Medical Decision Making:  Saad Renee is a 70 year old male who arrived into our stabilization room with hypotension and syncope versus near syncope.  He received fluids in route and since arrival, his blood pressure has steadily improved.  He is mildly anemic but reports no signs or symptoms consistent with gastrointestinal bleeding, though this is certainly a possibility.  No arrhythmias on the monitor the EKG.  No chest pain to suggest acute coronary syndrome.  EKG is otherwise reassuring.  Given his history of a pulmonary embolism, a CT scan was obtained.  Fortunately, no signs of a pulmonary embolism, pneumonia, aortic emergency or other intrathoracic abnormality.  His symptoms may be secondary to orthostasis or prerenal in nature, however, he does not have symptoms to suggest this.  Given his age and underlying risk factors we will thus admit him to the observation unit for further cardiac telemetry monitoring and reevaluation.  I spoke with Dr. Quevedo who graciously agreed to admit the patient.  The patient was in agreement with the plan as well.    Critical Care Time: was 35 minutes for this patient excluding procedures    Covid-19  Saad Renee was evaluated during a global COVID-19 pandemic, which necessitated consideration that the patient might be at risk for infection with the SARS-CoV-2 virus that causes COVID-19.   Applicable protocols for evaluation were followed during the patient's care.   COVID-19 was considered as part of the patient's evaluation. The plan for testing is:  a test was obtained during this  visit.    Diagnosis:    ICD-10-CM    1. Near syncope  R55    2. Other specified hypotension  I95.89    3. Anemia, unspecified type  D64.9        Disposition:  Admitted to an inpatient bed.    Scribe Disclosure:  Clint TORRES, am serving as a scribe at 8:39 AM on 1/11/2021 to document services personally performed by Breanne Rangel MD based on my observations and the provider's statements to me.        Breanne Rangel MD  01/12/21 6081

## 2021-01-11 NOTE — ED NOTES
Bed: ST01  Expected date:   Expected time:   Means of arrival:   Comments:  451  70 M weak/dizzy/st depression/poss cardiac involvement  0830

## 2021-01-11 NOTE — ED TRIAGE NOTES
Patient comes from home after feeling increasingly lightheaded for the last few weeks. Today had near syncopal event.

## 2021-01-11 NOTE — H&P
Admitted:     01/11/2021      CHIEF COMPLAINT:  Syncope.      HISTORY OBTAINED:  History obtained from the patient and chart review.      HISTORY OF PRESENT ILLNESS:  Mr. Saad Renee is a 70-year-old gentleman with a past medical history significant for pulmonary embolism, hypertension, hyperlipidemia, coronary artery disease, myelopathy with recent cervical decompression, GERD, BPH, depression, migraine and neuropathy, who presents to the Emergency Department today for evaluation after a syncopal episode.  The patient reports he was in his usual state of health this morning and was up in the bathroom shaving when he started to suddenly feel lightheaded and generally very fatigued.  He walked out of the bathroom and leaned against a book shelf in his room.  His daughter-in-law came to assist him and lowered him to the ground as he lost consciousness.  He believes he was unconscious for around 15 seconds.  When he came to, he still felt generally weak.  Denied any chest pain, shortness of breath, palpitations or focal weakness during this episode.  EMS was called.  The patient reports while in the ambulance, he started to feel better after receiving IV fluids.  He adds that he had a migraine over the weekend and was eating and drinking less than normal.  He also reports he does not drink much water at baseline and feels dehydrated.  He otherwise denies any recent illnesses and has not had any fevers, chills, cough, urinary symptoms, nausea, vomiting, blood in the stool or urine.  He was recently worked up prior to a cervical spine surgery and underwent echocardiogram and coronary angiogram in October 2020.  These showed EF of 50-55%, as well as mild nonobstructive coronary artery disease.  He reports he has been doing well since his surgery.  He is presently evaluated in his room in the Emergency Department and reports he is feeling quite a bit better than this morning, other than just a little bit tired.  He is  not currently experiencing any dizziness or lightheadedness.  No chest pain, shortness of breath, nausea.      REVIEW OF SYSTEMS:  A 10-point review of systems was conducted and is negative aside from the information in the HPI.      PAST MEDICAL HISTORY:   1.  History of pulmonary embolism postoperative and occurred in 1993 without recurrence.   2.  Hypertension.   3.  Hyperlipidemia.   4.  CAD.  He underwent coronary angiography on 10/30/2020 after a positive stress test.  This showed mild nonobstructive coronary artery disease.  He has no history of MI.   5.  Myelopathy status post cervical decompression 11/10/2020.   6.  GERD.   7.  BPH.   8.  Depression.   9.  History of migraine.   10.  Neuropathy.      PAST SURGICAL HISTORY:  No past surgical history on file.      ALLERGIES:   1.  HEPARIN.   2.  LISINOPRIL.      PRIOR TO ADMISSION MEDICATIONS:    Medications Prior to Admission   Medication Sig Dispense Refill Last Dose     albuterol (PROAIR HFA/PROVENTIL HFA/VENTOLIN HFA) 108 (90 Base) MCG/ACT inhaler Inhale 2 puffs into the lungs every 4 hours as needed for shortness of breath / dyspnea or wheezing   prn     aspirin 81 MG EC tablet Take 81 mg by mouth daily   1/10/2021 at Unknown time     atenolol (TENORMIN) 25 MG tablet Take 25 mg by mouth daily For arteriosclerotic cardiovascular disease   1/10/2021 at Unknown time     atorvastatin (LIPITOR) 40 MG tablet Take 40 mg by mouth At Bedtime   1/10/2021 at Unknown time     betamethasone dipropionate (DIPROSONE) 0.05 % external lotion Apply topically daily as needed (to scalp)   prn     fexofenadine (ALLEGRA) 60 MG tablet Take 30 mg by mouth daily Allergic rhinitis   1/10/2021 at Unknown time     fluticasone (FLONASE) 50 MCG/ACT nasal spray Spray 1 spray into both nostrils daily   1/10/2021 at Unknown time     ibuprofen (ADVIL/MOTRIN) 200 MG tablet Take 200 mg by mouth every 6 hours as needed for mild pain   prn     imipramine (TOFRANIL) 50 MG tablet Take 100 mg  by mouth At Bedtime For acquired polyneuropathy   1/10/2021 at Unknown time     Lidocaine (LIDOCARE) 4 % Patch Place 1 patch onto the skin daily as needed for moderate pain (to upper back) To prevent lidocaine toxicity, patient should be patch free for 12 hrs daily.   prn     multivitamin w/minerals (THERA-VIT-M) tablet Take 1 tablet by mouth daily   1/10/2021 at Unknown time     nitroGLYcerin (NITROSTAT) 0.4 MG sublingual tablet Place 0.4 mg under the tongue every 5 minutes as needed for chest pain For chest pain place 1 tablet under the tongue every 5 minutes for 3 doses. If symptoms persist 5 minutes after 1st dose call 911.   prn     omeprazole (PRILOSEC) 40 MG DR capsule Take 40 mg by mouth daily before breakfast For GERD   1/10/2021 at Unknown time     senna-docusate (SENOKOT-S/PERICOLACE) 8.6-50 MG tablet Take 1 tablet by mouth 2 times daily as needed for constipation   prn     sulfamethoxazole-trimethoprim (BACTRIM DS) 800-160 MG tablet Take 1 tablet by mouth daily X 30 days for acne vulgaris, starting 12/21/2020   1/10/2021 at Unknown time     tamsulosin (FLOMAX) 0.4 MG capsule Take 0.4 mg by mouth every evening For BPH   1/10/2021 at Unknown time     topiramate (TOPAMAX) 25 MG capsule Take 25 mg by mouth daily For acquired polyneuropathy   1/10/2021 at Unknown time     venlafaxine (EFFEXOR-XR) 75 MG 24 hr capsule Take 150 mg by mouth daily Peripheral sensory neuyropathy   1/10/2021 at Unknown time          SOCIAL HISTORY:  The patient reports social alcohol consumption, never smoked.  He lives with his son and daughter-in-law and is retired.      FAMILY HISTORY:  Both mother and father had history of CAD in their 80s.      LABORATORY EVALUATION:  CMP is largely unremarkable with creatinine of 1.1.  LFTs are normal.  .  Troponin less than 0.015 and TSH normal at 2.97.  White blood cell count is 8.1, hemoglobin 12.6, hematocrit 37, platelets are 398.  D-dimer 0.5.  COVID is negative.      IMAGING:  CT  of the head without contrast is negative for acute findings.  CT chest, PE protocol, is negative for pulmonary embolism.  This does show a patulous esophagus, possibly related to achalasia.      PHYSICAL EXAMINATION:   VITAL SIGNS:  Temperature 98.2, heart rate 73, blood pressure 120/86, respiratory rate 15, oxygen saturation is 98% on room air.   GENERAL:  Alert and oriented gentleman lying down in bed, appears comfortable and is pleasantly conversant.   HEENT:  Pupils equal and reactive to light, EOMI.  No nystagmus.  Sclerae anicteric.   ENT:  Mucous membranes are dry.   CARDIOVASCULAR:  Regular rate and rhythm, no murmurs appreciated.   RESPIRATORY:  Lungs are clear to auscultation bilaterally, no increased work of breathing or wheezing.   GASTROINTESTINAL:  Positive bowel sounds.  Abdomen is soft, nontender to palpation.   SKIN:  Warm and dry.   EXTREMITIES:  The patient moves all 4 extremities.  Normal tone.   MUSCULOSKELETAL:  Strength is 5/5 in upper extremities and left lower extremity +4/5 in right lower extremity which patient reports is baseline and unchanged.   NEUROLOGIC:  Speech is clear.  Face symmetric.  Tongue midline.  Cranial nerves II-XII are grossly intact.      ASSESSMENT AND PLAN:  Mr. Saad Renee is a 70-year-old gentleman with a history of hypertension, hyperlipidemia, CAD, PE myelopathy, GERD, BPH, depression, migraines and neuropathy, who presented to the Emergency Department today for evaluation of a syncopal episode.  He is being admitted under observation for further monitoring.   1.  Syncope:  The patient reports developing lightheadedness while standing in his bathroom shaving this morning.  He was lowered to the floor by a family member and reports losing consciousness for approximately 15 seconds.  He had no associated chest pain, shortness of breath, palpitations or focal weakness.  CT of the head is unremarkable.  D-dimer is within normal limits and CT PE is negative.  By  history, his symptoms seem most consistent with dehydration as he reports decreased p.o. intake over the weekend and generally poor water consumption.  Other differentials include arrhythmia, medication reaction as he was recently switched to venlafaxine from prior SSRI due to insurance coverage.  He does, however, report an episode of mild lightheadedness preceding this medication change.  Symptoms and blood pressure has improved after administration of IV fluid.  Lower suspicion for other cardiac cause as recent echocardiogram and coronary angiogram were largely unremarkable     -- Continue IV fluids overnight.   -- Check orthostatics this afternoon.   -- Will defer repeating echocardiogram for now.   -- Monitor on telemetry overnight, can consider outpatient cardiac monitoring versus starting with hydration tomorrow.   -- We will hold blood pressure medications for today.  Revaluate tomorrow.   2.  Hypertension:  BP reportedly in the 70s in the field, now improved to 120s.  The patient no longer having symptoms.  We will hold his prior to admission atenolol for today.  Revaluate tomorrow.   3.  Neuropathy:  The patient reports he is on imipramine, topiramate, and venlafaxine for this.  Recently switched to venlafaxine from other SSRI due to insurance coverage.  We will continue these prior to admission medications.   4.  Benign prostatic hypertrophy:  Continue prior to admission Flomax.   5.  Acne:  Continue prior to admission Bactrim.   6.  Recent cervical decompression 11/10/2020:  C-collar per surgeon's instructions.  He has been told he could wean this and does not wear it at all times.   7.  Coronary artery disease:  As previously discussed, recent on angiogram 10/30/2020 showed mild nonobstructive CAD.  We will continue his prior to admission aspirin.  He can resume atorvastatin at discharge and atenolol will be reevaluated tomorrow.  EKG without evidence of ST elevations and initial troponin is negative.   We will trend his troponin.      CODE STATUS:  The patient is DNR/DNI.  This was discussed with the patient on admission.      The patient was discussed with Dr. Eder Quevedo who agreed with the above plan.         EDER QUEVEDO MD       As dictated by SATINDER WHEELER PA-C            D: 2021   T: 2021   MT: MARCUS      Name:     REGULO MOBLEY   MRN:      [id number]        Account:      [id number]   :      1950        Admitted:     2021                   Document: [id number]

## 2021-01-11 NOTE — PROGRESS NOTES
RECEIVING UNIT ED HANDOFF REVIEW    ED Nurse Handoff Report was reviewed by: Jamey Ness RN on January 11, 2021 at 12:32 PM

## 2021-01-11 NOTE — ED NOTES
[facility]  ED Nurse Handoff Report    ED Chief complaint: Fall      ED Diagnosis:   Final diagnoses:   Near syncope   Other specified hypotension   Anemia, unspecified type       Code Status: Full Code    Allergies:   Allergies   Allergen Reactions     Heparin (Bovine) Anaphylaxis     Blood Clots  Blood Clots  Blood Clots       Heparin      Blood clots     Lisinopril      Other reaction(s): *Unknown  Lightheadedness  Other reaction(s): *Unknown  Lightheadedness  Lightheadedness         Patient Story: pt feeling tired and weak with chest tightness for 3 days today had a near syncopal episode was helped to the groud no injuries bp for medics was in the 70s   Focused Assessment:  Pt bp now 120/86 after a liter of fluids feeling better labs look good covid neg    Treatments and/or interventions provided: liter of fluids  Patient's response to treatments and/or interventions:     To be done/followed up on inpatient unit:      Does this patient have any cognitive concerns?:     Activity level - Baseline/Home:  Independent  Activity Level - Current:   Stand with Assist    Patient's Preferred language: English   Needed?: No    Isolation: None  Infection: Not Applicable  Patient tested for COVID 19 prior to admission: YES  Bariatric?: No    Vital Signs:   Vitals:    01/11/21 0946 01/11/21 1000 01/11/21 1015 01/11/21 1030   BP:  115/75 120/76 122/86   Pulse: 66 67 69 68   Resp: 16 16 14    Temp:       TempSrc:       SpO2: 98% 98% 98%    Weight:       Height:           Cardiac Rhythm:     Was the PSS-3 completed:   Yes  What interventions are required if any?               Family Comments: none here  OBS brochure/video discussed/provided to patient/family: Yes              Name of person given brochure if not patient:               Relationship to patient:     For the majority of the shift this patient's behavior was Green.   Behavioral interventions performed were .    ED NURSE PHONE NUMBER:  [phone]

## 2021-01-12 VITALS
WEIGHT: 176.37 LBS | SYSTOLIC BLOOD PRESSURE: 133 MMHG | DIASTOLIC BLOOD PRESSURE: 83 MMHG | RESPIRATION RATE: 18 BRPM | TEMPERATURE: 96.3 F | HEIGHT: 73 IN | OXYGEN SATURATION: 99 % | HEART RATE: 100 BPM | BODY MASS INDEX: 23.37 KG/M2

## 2021-01-12 LAB
ANION GAP SERPL CALCULATED.3IONS-SCNC: 4 MMOL/L (ref 3–14)
BASOPHILS # BLD AUTO: 0.1 10E9/L (ref 0–0.2)
BASOPHILS NFR BLD AUTO: 0.9 %
BUN SERPL-MCNC: 17 MG/DL (ref 7–30)
CALCIUM SERPL-MCNC: 8.8 MG/DL (ref 8.5–10.1)
CHLORIDE SERPL-SCNC: 110 MMOL/L (ref 94–109)
CO2 SERPL-SCNC: 23 MMOL/L (ref 20–32)
CREAT SERPL-MCNC: 0.91 MG/DL (ref 0.66–1.25)
DIFFERENTIAL METHOD BLD: ABNORMAL
EOSINOPHIL # BLD AUTO: 0.2 10E9/L (ref 0–0.7)
EOSINOPHIL NFR BLD AUTO: 3.3 %
ERYTHROCYTE [DISTWIDTH] IN BLOOD BY AUTOMATED COUNT: 14.4 % (ref 10–15)
GFR SERPL CREATININE-BSD FRML MDRD: 85 ML/MIN/{1.73_M2}
GLUCOSE SERPL-MCNC: 91 MG/DL (ref 70–99)
HCT VFR BLD AUTO: 36.2 % (ref 40–53)
HGB BLD-MCNC: 11 G/DL (ref 13.3–17.7)
IMM GRANULOCYTES # BLD: 0 10E9/L (ref 0–0.4)
IMM GRANULOCYTES NFR BLD: 0.5 %
LYMPHOCYTES # BLD AUTO: 1.1 10E9/L (ref 0.8–5.3)
LYMPHOCYTES NFR BLD AUTO: 18.6 %
MCH RBC QN AUTO: 27.3 PG (ref 26.5–33)
MCHC RBC AUTO-ENTMCNC: 30.4 G/DL (ref 31.5–36.5)
MCV RBC AUTO: 90 FL (ref 78–100)
MONOCYTES # BLD AUTO: 0.6 10E9/L (ref 0–1.3)
MONOCYTES NFR BLD AUTO: 9.8 %
NEUTROPHILS # BLD AUTO: 3.9 10E9/L (ref 1.6–8.3)
NEUTROPHILS NFR BLD AUTO: 66.9 %
NRBC # BLD AUTO: 0 10*3/UL
NRBC BLD AUTO-RTO: 0 /100
PLATELET # BLD AUTO: 288 10E9/L (ref 150–450)
POTASSIUM SERPL-SCNC: 3.9 MMOL/L (ref 3.4–5.3)
RBC # BLD AUTO: 4.03 10E12/L (ref 4.4–5.9)
SODIUM SERPL-SCNC: 137 MMOL/L (ref 133–144)
WBC # BLD AUTO: 5.8 10E9/L (ref 4–11)

## 2021-01-12 PROCEDURE — 80048 BASIC METABOLIC PNL TOTAL CA: CPT | Performed by: PHYSICIAN ASSISTANT

## 2021-01-12 PROCEDURE — 96361 HYDRATE IV INFUSION ADD-ON: CPT

## 2021-01-12 PROCEDURE — 258N000003 HC RX IP 258 OP 636: Performed by: PHYSICIAN ASSISTANT

## 2021-01-12 PROCEDURE — 99217 PR OBSERVATION CARE DISCHARGE: CPT | Performed by: HOSPITALIST

## 2021-01-12 PROCEDURE — 85025 COMPLETE CBC W/AUTO DIFF WBC: CPT | Performed by: PHYSICIAN ASSISTANT

## 2021-01-12 PROCEDURE — 250N000013 HC RX MED GY IP 250 OP 250 PS 637: Mod: GY | Performed by: PHYSICIAN ASSISTANT

## 2021-01-12 PROCEDURE — 36415 COLL VENOUS BLD VENIPUNCTURE: CPT | Performed by: PHYSICIAN ASSISTANT

## 2021-01-12 PROCEDURE — 250N000013 HC RX MED GY IP 250 OP 250 PS 637: Performed by: INTERNAL MEDICINE

## 2021-01-12 PROCEDURE — G0378 HOSPITAL OBSERVATION PER HR: HCPCS

## 2021-01-12 RX ORDER — ATENOLOL 25 MG/1
25 TABLET ORAL DAILY
Start: 2021-01-14

## 2021-01-12 RX ADMIN — ASPIRIN 81 MG: 81 TABLET, DELAYED RELEASE ORAL at 07:57

## 2021-01-12 RX ADMIN — TOPIRAMATE 25 MG: 25 TABLET, FILM COATED ORAL at 07:57

## 2021-01-12 RX ADMIN — SULFAMETHOXAZOLE AND TRIMETHOPRIM 1 TABLET: 800; 160 TABLET ORAL at 07:56

## 2021-01-12 RX ADMIN — VENLAFAXINE HYDROCHLORIDE 150 MG: 150 CAPSULE, EXTENDED RELEASE ORAL at 07:56

## 2021-01-12 RX ADMIN — SODIUM CHLORIDE: 9 INJECTION, SOLUTION INTRAVENOUS at 08:02

## 2021-01-12 NOTE — PROGRESS NOTES
Observation goals PRIOR TO DISCHARGE    Comments: List all  goals to be met before discharge:   - Diagnostic tests and consults completed and resulted -not met  - No further episodes of syncope and any new arrhythmia addressed with controlled heart rates-partially met   - Vital signs normal or at patient baseline and orthostatic vitals are normal and patient not lightheaded with standing -partially met  - Tolerating oral intake to maintain hydration -met  - Safe disposition plan has been identified -not met  - Nurse to notify provider when observation goals have been met and patient is ready for discharge.

## 2021-01-12 NOTE — PLAN OF CARE
Covid neg. VSS on RA. A/Ox4. Ax 1 w/ walker and GB. Denies pain, dizziness at rest but states they have some w/ activity. No HA or N/V. Tele is NS. Neuropathy @ baseline. NS running @ 100 in right AV, left IV is SL. Continue to monitor.      Observation Goals:        - Diagnostic tests and consults completed and resulted - not met  - No further episodes of syncope and any new arrhythmia addressed with controlled heart rates - partially met  - Vital signs normal or at patient baseline and orthostatic vitals are normal and patient not lightheaded with standing - not met, still lightheaded at times  - Tolerating oral intake to maintain hydration - met  - Safe disposition plan has been identified - not met  - Nurse to notify provider when observation goals have been met and patient is ready for discharge.

## 2021-01-12 NOTE — PROGRESS NOTES
[facility]    Medicine Progress Note - Hospitalist Service       Date of Admission:  1/11/2021    Assessment & Plan       Mr. Saad Renee is a 70-year-old gentleman with a history of hypertension, hyperlipidemia, CAD, PE myelopathy, GERD, BPH, depression, migraines and neuropathy, who presented to the Emergency Department 1/11/21 for evaluation of a syncopal episode.  Admitted under observation for further monitoring.     Syncope:  The patient reports developing lightheadedness while standing in his bathroom shaving this morning.  He was lowered to the floor by a family member and reports losing consciousness for approximately 15 seconds.  He had no associated chest pain, shortness of breath, palpitations or focal weakness.  CT of the head is unremarkable.  D-dimer is within normal limits and CT PE is negative.  By history, his symptoms seem most consistent with dehydration as he reports decreased p.o. intake over the weekend in the context of migraine headache and generally poor water consumption.  Other differentials include arrhythmia, medication reaction as he was recently switched to venlafaxine from prior SSRI due to insurance coverage for neuropathic pain at the end of December with recent dose adjustment (3-->2 tablets 1 week prior).  He does, however, report an episode of mild lightheadedness preceding this medication change.  Symptoms and blood pressure improved after administration of IV fluid.  Lower suspicion for other cardiac cause as recent echocardiogram and coronary angiogram were largely unremarkable     -- Telemetry with no overt arrhythmia  -- Orthostatics this AM positive, patient denies ongoing symptoms of dizziness or lightheadedness   -- Recommend pt ambulate with nursing today, if continues to be asymptomatic, plan for discharge  -- Adequate oral fluid encouraged, no ongoing need for IVF   -- If sx persist upon discharge, could consider down-titrating  Venlafaxine  -- Will resume Atenolol 25 mg po every day on 1/14, but again, if recurrent symptoms despite ongoing adequate oral fluid intake, would downtitrate dose.     Hypertension:  BP reportedly in the 70s in the field, now improved to 120s.  The patient no longer having symptoms.  Resume Atenolol 1/14.     Neuropathy: In the context of severe lumbar stenosis for which patient is going to have surgery in a few weeks. The patient reports he is on imipramine, topiramate, and venlafaxine for this.  Recently switched to venlafaxine from other SSRI due to insurance coverage.  We will continue these prior to admission medications.     Benign prostatic hypertrophy:  Continue prior to admission Flomax.     Acne:  Continue prior to admission Bactrim.     Recent cervical decompression 11/10/2020:  C-collar per surgeon's instructions.  He has been told he could wean this and does not wear it at all times.     Coronary artery disease:  As previously discussed, recent on angiogram 10/30/2020 showed mild nonobstructive CAD.  We will continue his prior to admission aspirin.  He can resume atorvastatin at discharge. Atenolol to be resumed as above on 1/14.  EKG without evidence of ST elevations and initial troponin is negative.  We will trend his troponin.      Diet: Combination Diet Low Saturated Fat Na <2400mg Diet, No Caffeine Diet    DVT Prophylaxis: Ambulate every shift  Dawson Catheter: not present  Code Status: No CPR- Do NOT Intubate         Disposition Plan   Expected discharge: Today, recommended to prior living arrangement once ambulated without sx.  Entered: Marina Ellis PA-C 01/12/2021, 7:56 AM     The patient's care was discussed with the Attending Physician, Dr. Mendoza, Bedside Nurse and Patient.    Marina Ellis PA-C  Hospitalist Service  [facility]  Contact information available via [facility]  [employer] [facility] Clinicing/Directory  ______________________________________________________________________    Interval History   No acute events overnight. Denies chest pain, dizziness, lightheadedness, nausea, vomiting. Afebrile. No acute concerns. Reports migraine over the weekend with poor PO intake.     Data reviewed today: I reviewed all medications, new labs and imaging results over the last 24 hours. I personally reviewed all labs and imaging to date.     Physical Exam   Vital Signs: Temp: 96  F (35.6  C) Temp src: Oral BP: (!) 140/79 Pulse: 86   Resp: 18 SpO2: 97 % O2 Device: None (Room air)    Weight: 176 lbs 5.89 oz    CONSTITUTIONAL: Pt laying in bed, dressed in hospital garb. Appears comfortable. Cooperative with interview.   HEENT: Normocephalic, atraumatic.   CARDIOVASCULAR: RRR, no murmurs, rubs, or extra heart sounds appreciated. Pulses +2/4 and regular in upper and lower extremities, bilaterally.   RESPIRATORY: No increased work of breathing.  CTA, bilat; no wheezes, rales, or rhonchi appreciated.  GASTROINTESTINAL:  Abdomen soft, non-distended. BS auscultated in all four quadrants. Negative for tenderness to palpation.  No masses or organomegaly noted.  MUSCULOSKELETAL: No gross deformities noted. Normal muscle tone.   HEMATOLOGIC/LYMPHATIC/IMMUNOLOGIC: No anterior or posterior cervical LAD, bilaterally. Negative for lower extremity edema, bilaterally.  NEUROLOGIC: Alert and oriented to person, place, and time.  strength intact.No focal neuro deficits   SKIN: Warm, dry, intact. No jaundice noted. Negative for suspicious lesions, rashes, bruising, open sores or abrasions.     Data   Recent Labs   Lab 01/12/21  0644 01/11/21  1758 01/11/21  1307 01/11/21  0851 01/11/21  0850   WBC 5.8  --   --   --  8.1   HGB 11.0*  --   --  12.6* 12.1*   MCV 90  --   --   --  89     --   --   --  398     --   --  139 137   POTASSIUM 3.9  --   --  4.3 4.2   CHLORIDE 110*  --   --   --  108   CO2 23  --   --   --  24    BUN 17  --   --   --  17   CR 0.91  --   --   --  1.10   ANIONGAP 4  --   --   --  5   CELESTINA 8.8  --   --   --  8.6   GLC 91  --   --   --  158*   ALBUMIN  --   --   --   --  2.9*   PROTTOTAL  --   --   --   --  6.7*   BILITOTAL  --   --   --   --  0.4   ALKPHOS  --   --   --   --  103   ALT  --   --   --   --  20   AST  --   --   --   --  13   TROPI  --  <0.015 <0.015  --  <0.015     No results found for this or any previous visit (from the past 24 hour(s)).  Medications     sodium chloride 100 mL/hr at 01/12/21 0802       aspirin  81 mg Oral Daily     [Held by provider] atenolol  25 mg Oral Daily     imipramine  100 mg Oral At Bedtime     sodium chloride (PF)  3 mL Intracatheter Q8H     sulfamethoxazole-trimethoprim  1 tablet Oral Daily     tamsulosin  0.4 mg Oral QPM     topiramate  25 mg Oral Daily     venlafaxine  150 mg Oral Daily

## 2021-01-12 NOTE — PROGRESS NOTES
Observation goals PRIOR TO DISCHARGE    Comments: List all  goals to be met before discharge:      - Diagnostic tests and consults completed and resulted : Not met     - No further episodes of syncope and any new arrhythmia addressed with controlled heart rates : Met     - Vital signs normal or at patient baseline and orthostatic vitals are normal and patient not lightheaded with standing : Not met. +Orthostatic BP     - Tolerating oral intake to maintain hydration: Met      - Safe disposition plan has been identified : No     - Nurse to notify provider when observation goals have been met and patient is ready for discharge.

## 2021-01-12 NOTE — PROGRESS NOTES
Observation goals PRIOR TO DISCHARGE    Comments: List all  goals to be met before discharge:      - Diagnostic tests and consults completed and resulted : Met     - No further episodes of syncope and any new arrhythmia addressed with controlled heart rates : Met     - Vital signs normal or at patient baseline and orthostatic vitals are normal and patient not lightheaded with standing : Met. +Orthostatic BP     - Tolerating oral intake to maintain hydration: Met      - Safe disposition plan has been identified : Yes     - Nurse to notify provider when observation goals have been met and patient is ready for discharge.

## 2021-01-12 NOTE — PROGRESS NOTES
Pt arrived from ED at around 1300 hrs. OVID-19 Negative. A/OX4. +orthostatic BP, other VSS on RA. Tele: SR. Denies pain. Neuropathy to extremities baseline. Amarilis cardiac diet well. Continues on IVF as ordered.    home w/ assist/aide set up before discharge

## 2021-01-12 NOTE — DISCHARGE SUMMARY
[facility]  Hospitalist Discharge Summary      Date of Admission:  1/11/2021  Date of Discharge:  1/12/2021  Discharging Provider: Marina Ellis PA-C    Discharge Diagnoses   Syncope    Hypertension  BPH   Recent cervical decompression 11/10/2020  Acne  Severe lumbar stenosis with associated neuropathy   Nonobstructive CAD    Follow-ups Needed After Discharge   Follow-up Appointments     Follow-up and recommended labs and tests       Follow up with primary care provider, Mauricio Angeles, within 7 days for   hospital follow- up.  The following labs/tests are recommended: None.   Please monitor and record daily blood pressures and bring values to PCP at   follow-up.             Unresulted Labs Ordered in the Past 30 Days of this Admission     No orders found for last 31 day(s).      These results will be followed up by PCP    Discharge Disposition   Discharged to home  Condition at discharge: Stable    Hospital Course   Mr. Saad Renee is a 70-year-old gentleman with a history of hypertension, hyperlipidemia, CAD, PE myelopathy, GERD, BPH, depression, migraines and neuropathy, who presented to the Emergency Department 1/11/21 for evaluation of a syncopal episode.  Admitted under observation for further monitoring.      Syncope:  The patient reports developing lightheadedness while standing in his bathroom shaving this morning.  He was lowered to the floor by a family member and reports losing consciousness for approximately 15 seconds.  He had no associated chest pain, shortness of breath, palpitations or focal weakness.  CT of the head is unremarkable.  D-dimer is within normal limits and CT PE is negative.  By history, his symptoms seem most consistent with dehydration as he reports decreased p.o. intake over the weekend in the context of migraine headache and generally poor water consumption.  Other differentials include arrhythmia, medication reaction as he was  recently switched to venlafaxine from prior SSRI due to insurance coverage for neuropathic pain at the end of December with recent dose adjustment (3-->2 tablets 1 week prior).  He does, however, report an episode of mild lightheadedness preceding this medication change.  Symptoms and blood pressure improved after administration of IV fluid.  Lower suspicion for other cardiac cause as recent echocardiogram and coronary angiogram were largely unremarkable     -- Telemetry with no overt arrhythmia  -- Orthostatics this AM positive, patient denies ongoing symptoms of dizziness or lightheadedness   -- Pt ambulated the halls without recurrent sx prior to discharge   -- Adequate oral fluid encouraged  -- If sx persist upon discharge, could consider down-titrating Venlafaxine  -- Will resume Atenolol 25 mg po every day on 1/14, but again, if recurrent symptoms despite ongoing adequate oral fluid intake, would downtitrate dose.   -- Pt to monitor BPs at home and report results to PCP at follow-up  -- Recommend PCP follow-up within the next week      Hypertension:  BP reportedly in the 70s in the field, now improved to 120s.  The patient no longer having symptoms.  Resume Atenolol 1/14.      Neuropathy: In the context of severe lumbar stenosis for which patient is going to have surgery in the near future. The patient reports he is on imipramine, topiramate, and venlafaxine for this.  Recently switched to venlafaxine from other SSRI due to insurance coverage.  We will continue these prior to admission medications.      Benign prostatic hypertrophy:  Continue prior to admission Flomax.      Acne:  Continue prior to admission Bactrim.      Recent cervical decompression 11/10/2020:  C-collar per surgeon's instructions.  He has been told he could wean this and does not wear it at all times.      Coronary artery disease:  As previously discussed, recent on angiogram 10/30/2020 showed mild nonobstructive CAD.  We will continue his  prior to admission aspirin.  He can resume atorvastatin at discharge. Atenolol to be resumed as above on 1/14.  EKG without evidence of ST elevations and initial troponin is negative.  We will trend his troponin.     Consultations This Hospital Stay   None    Code Status   No CPR- Do NOT Intubate    Time Spent on this Encounter   I, Marina Ellis PA-C, personally saw the patient today and spent greater than 30 minutes discharging this patient.       Marina Ellis PA-C  [facility]  [address] [facility] [address]  Phone: [phone]  ______________________________________________________________________    Physical Exam   Vital Signs: Temp: 96.3  F (35.7  C) Temp src: Axillary BP: 133/83 Pulse: 100   Resp: 18 SpO2: 99 % O2 Device: None (Room air)    Weight: 176 lbs 5.89 oz    Refer to physical exam from progress note from 1/12       Primary Care Physician   Mauricio Angeles    Discharge Orders      Reason for your hospital stay    You were hospitalized for further evaluation of a fainting spell, suspect related to dehydration in the setting of recent migraine headache.     Follow-up and recommended labs and tests     Follow up with primary care provider, Mauricio Angeles, within 7 days for hospital follow- up.  The following labs/tests are recommended: None. Please monitor and record daily blood pressures and bring values to PCP at follow-up.     Activity    Your activity upon discharge: activity as tolerated     Discharge Instructions    1) Follow-up with your primary care provider in the next week to discuss hospitalization   2) Hold Atenolol until 1/14, resume thereafter   3) Adequate oral fluid intake encouraged   4) Monitor and record blood pressures at home, record values and bring to PCP at follow-up     Diet    Follow this diet upon discharge: Orders Placed This Encounter      Combination Diet Low Saturated Fat Na <2400mg  Diet, No Caffeine Diet       Significant Results and Procedures   Most Recent 3 CBC's:  Recent Labs   Lab Test 01/12/21  0644 01/11/21  0851 01/11/21  0850   WBC 5.8  --  8.1   HGB 11.0* 12.6* 12.1*   MCV 90  --  89     --  398     Most Recent 3 BMP's:  Recent Labs   Lab Test 01/12/21  0644 01/11/21  0851 01/11/21  0850    139 137   POTASSIUM 3.9 4.3 4.2   CHLORIDE 110*  --  108   CO2 23  --  24   BUN 17  --  17   CR 0.91  --  1.10   ANIONGAP 4  --  5   CELESTINA 8.8  --  8.6   GLC 91  --  158*     Most Recent 2 LFT's:  Recent Labs   Lab Test 01/11/21  0850   AST 13   ALT 20   ALKPHOS 103   BILITOTAL 0.4     Most Recent 3 Troponin's:  Recent Labs   Lab Test 01/11/21  1758 01/11/21  1307 01/11/21  0850   TROPI <0.015 <0.015 <0.015     Most Recent 3 BNP's:  Recent Labs   Lab Test 01/11/21  0850   NTBNPI 170     Most Recent D-dimer:  Recent Labs   Lab Test 01/11/21  0850   DD 0.5   ,   Results for orders placed or performed during the hospital encounter of 01/11/21   CT Head w/o Contrast    Narrative    CT SCAN OF THE HEAD WITHOUT CONTRAST   1/11/2021 9:23 AM     HISTORY: Headache, near syncope.    TECHNIQUE:  Axial images of the head and coronal reformations without  IV contrast material. Radiation dose for this scan was reduced using  automated exposure control, adjustment of the mA and/or kV according  to patient size, or iterative reconstruction technique.    COMPARISON: None.    FINDINGS: There is no evidence of intracranial hemorrhage, mass, acute  infarct or anomaly. The ventricles are normal in size, shape and  configuration. The brain parenchyma and subarachnoid spaces are  normal.     The visualized portions of the sinuses and mastoids appear normal. The  bony calvarium and bones of the skull base appear intact.       Impression    IMPRESSION:   No evidence of acute intracranial hemorrhage, mass, or  herniation.    GALLITO PERRY MD   CT Chest Pulmonary Embolism w Contrast    Narrative    CT CHEST  PULMONARY EMBOLISM WITH CONTRAST 1/11/2021 9:24 AM    CLINICAL HISTORY: Near syncope, hypotension, history of pulmonary  embolus.    TECHNIQUE: CT angiogram chest during arterial phase injection IV  contrast. 2D and 3D MIP reconstructions were performed by the CT  technologist. Dose reduction techniques were used.     CONTRAST: 68 mL Isovue-[id number]    COMPARISON: None.    FINDINGS:  ANGIOGRAM CHEST: Pulmonary arteries are normal caliber and negative  for pulmonary emboli. Thoracic aorta is negative for dissection. No CT  evidence of right heart strain.    LUNGS AND PLEURA: The lungs are clear. No pneumothorax or pleural  effusion.    MEDIASTINUM/AXILLAE: No coronary calcifications. No lymphadenopathy.  Thoracic aortic caliber within normal limits. Unremarkable thyroid.  Patulous esophagus.    UPPER ABDOMEN: Normal.    MUSCULOSKELETAL: Normal.      Impression    IMPRESSION:  1.  No evidence of pulmonary embolism or other acute chest  abnormality.  2.  Patulous esophagus could be related to achalasia.    DOROTHY PELAEZ MD       Discharge Medications   Current Discharge Medication List      CONTINUE these medications which have CHANGED    Details   atenolol (TENORMIN) 25 MG tablet Take 1 tablet (25 mg) by mouth daily For arteriosclerotic cardiovascular disease  Qty:      Comments: Hold medication until 1/14.  Associated Diagnoses: Benign essential hypertension         CONTINUE these medications which have NOT CHANGED    Details   albuterol (PROAIR HFA/PROVENTIL HFA/VENTOLIN HFA) 108 (90 Base) MCG/ACT inhaler Inhale 2 puffs into the lungs every 4 hours as needed for shortness of breath / dyspnea or wheezing    Comments: Pharmacy may dispense brand covered by insurance (Proair, or proventil or ventolin or generic albuterol inhaler)      aspirin 81 MG EC tablet Take 81 mg by mouth daily      atorvastatin (LIPITOR) 40 MG tablet Take 40 mg by mouth At Bedtime      betamethasone dipropionate (DIPROSONE) 0.05 % external lotion  Apply topically daily as needed (to scalp)      fexofenadine (ALLEGRA) 60 MG tablet Take 30 mg by mouth daily Allergic rhinitis      fluticasone (FLONASE) 50 MCG/ACT nasal spray Spray 1 spray into both nostrils daily      ibuprofen (ADVIL/MOTRIN) 200 MG tablet Take 200 mg by mouth every 6 hours as needed for mild pain      imipramine (TOFRANIL) 50 MG tablet Take 100 mg by mouth At Bedtime For acquired polyneuropathy      Lidocaine (LIDOCARE) 4 % Patch Place 1 patch onto the skin daily as needed for moderate pain (to upper back) To prevent lidocaine toxicity, patient should be patch free for 12 hrs daily.      multivitamin w/minerals (THERA-VIT-M) tablet Take 1 tablet by mouth daily      nitroGLYcerin (NITROSTAT) 0.4 MG sublingual tablet Place 0.4 mg under the tongue every 5 minutes as needed for chest pain For chest pain place 1 tablet under the tongue every 5 minutes for 3 doses. If symptoms persist 5 minutes after 1st dose call 911.      omeprazole (PRILOSEC) 40 MG DR capsule Take 40 mg by mouth daily before breakfast For GERD      senna-docusate (SENOKOT-S/PERICOLACE) 8.6-50 MG tablet Take 1 tablet by mouth 2 times daily as needed for constipation      sulfamethoxazole-trimethoprim (BACTRIM DS) 800-160 MG tablet Take 1 tablet by mouth daily X 30 days for acne vulgaris, starting 12/21/2020      tamsulosin (FLOMAX) 0.4 MG capsule Take 0.4 mg by mouth every evening For BPH      topiramate (TOPAMAX) 25 MG capsule Take 25 mg by mouth daily For acquired polyneuropathy      venlafaxine (EFFEXOR-XR) 75 MG 24 hr capsule Take 150 mg by mouth daily Peripheral sensory neuyropathy           Allergies   Allergies   Allergen Reactions     Heparin (Bovine) Anaphylaxis     Blood Clots  Blood Clots  Blood Clots       Heparin      Blood clots     Lisinopril      Other reaction(s): *Unknown  Lightheadedness  Other reaction(s): *Unknown  Lightheadedness  Lightheadedness

## 2021-01-12 NOTE — PROGRESS NOTES
Observation goals PRIOR TO DISCHARGE    Comments: List all  goals to be met before discharge:     - Diagnostic tests and consults completed and resulted : Not met    - No further episodes of syncope and any new arrhythmia addressed with controlled heart rates : Partially met    - Vital signs normal or at patient baseline and orthostatic vitals are normal and patient not lightheaded with standing : Not met. +Orthostatic BP    - Tolerating oral intake to maintain hydration: Met     - Safe disposition plan has been identified : No    - Nurse to notify provider when observation goals have been met and patient is ready for discharge.

## 2021-01-12 NOTE — PLAN OF CARE
Covid negative. A&Ox4. VSS on RA. TELE SR. Cardiac diet. IVF NS @100ml/hr. Up Ax1 with walker. L PIV SL. Denied pain/nausea/diarrhea. Continue to monitor.       Observation goals PRIOR TO DISCHARGE    Comments: List all  goals to be met before discharge:   - Diagnostic tests and consults completed and resulted -not met  - No further episodes of syncope and any new arrhythmia addressed with controlled heart rates-partially met   - Vital signs normal or at patient baseline and orthostatic vitals are normal and patient not lightheaded with standing -partially met  - Tolerating oral intake to maintain hydration -met  - Safe disposition plan has been identified -not met  - Nurse to notify provider when observation goals have been met and patient is ready for discharge.

## 2021-01-12 NOTE — PLAN OF CARE
Pt is discharging home at this time with all his belongings. AVS and education given. Questions answered.

## 2021-06-04 ENCOUNTER — HOSPITAL ENCOUNTER (EMERGENCY)
Facility: CLINIC | Age: 71
Discharge: HOME | End: 2021-06-05
Attending: EMERGENCY MEDICINE | Admitting: EMERGENCY MEDICINE
Payer: MEDICARE

## 2021-06-04 DIAGNOSIS — G43.909 MIGRAINE WITHOUT STATUS MIGRAINOSUS, NOT INTRACTABLE, UNSPECIFIED MIGRAINE TYPE: ICD-10-CM

## 2021-06-04 LAB
BASOPHILS # BLD AUTO: 0 10E9/L (ref 0–0.2)
BASOPHILS NFR BLD AUTO: 0.6 %
DIFFERENTIAL METHOD BLD: ABNORMAL
EOSINOPHIL # BLD AUTO: 0.1 10E9/L (ref 0–0.7)
EOSINOPHIL NFR BLD AUTO: 1.9 %
ERYTHROCYTE [DISTWIDTH] IN BLOOD BY AUTOMATED COUNT: 16.7 % (ref 10–15)
HCT VFR BLD AUTO: 35.5 % (ref 40–53)
HGB BLD-MCNC: 10.5 G/DL (ref 13.3–17.7)
IMM GRANULOCYTES # BLD: 0 10E9/L (ref 0–0.4)
IMM GRANULOCYTES NFR BLD: 0.3 %
LYMPHOCYTES # BLD AUTO: 0.9 10E9/L (ref 0.8–5.3)
LYMPHOCYTES NFR BLD AUTO: 13.7 %
MCH RBC QN AUTO: 22.7 PG (ref 26.5–33)
MCHC RBC AUTO-ENTMCNC: 29.6 G/DL (ref 31.5–36.5)
MCV RBC AUTO: 77 FL (ref 78–100)
MONOCYTES # BLD AUTO: 0.4 10E9/L (ref 0–1.3)
MONOCYTES NFR BLD AUTO: 6.5 %
NEUTROPHILS # BLD AUTO: 4.8 10E9/L (ref 1.6–8.3)
NEUTROPHILS NFR BLD AUTO: 77 %
NRBC # BLD AUTO: 0 10*3/UL
NRBC BLD AUTO-RTO: 0 /100
PLATELET # BLD AUTO: 336 10E9/L (ref 150–450)
RBC # BLD AUTO: 4.63 10E12/L (ref 4.4–5.9)
WBC # BLD AUTO: 6.3 10E9/L (ref 4–11)

## 2021-06-04 PROCEDURE — 80048 BASIC METABOLIC PNL TOTAL CA: CPT | Performed by: EMERGENCY MEDICINE

## 2021-06-04 PROCEDURE — 250N000011 HC RX IP 250 OP 636: Performed by: EMERGENCY MEDICINE

## 2021-06-04 PROCEDURE — 96375 TX/PRO/DX INJ NEW DRUG ADDON: CPT

## 2021-06-04 PROCEDURE — 85025 COMPLETE CBC W/AUTO DIFF WBC: CPT | Performed by: EMERGENCY MEDICINE

## 2021-06-04 PROCEDURE — 99284 EMERGENCY DEPT VISIT MOD MDM: CPT | Mod: 25

## 2021-06-04 PROCEDURE — 96374 THER/PROPH/DIAG INJ IV PUSH: CPT

## 2021-06-04 RX ORDER — KETOROLAC TROMETHAMINE 15 MG/ML
15 INJECTION, SOLUTION INTRAMUSCULAR; INTRAVENOUS ONCE
Status: COMPLETED | OUTPATIENT
Start: 2021-06-04 | End: 2021-06-04

## 2021-06-04 RX ORDER — ONDANSETRON 2 MG/ML
4 INJECTION INTRAMUSCULAR; INTRAVENOUS ONCE
Status: COMPLETED | OUTPATIENT
Start: 2021-06-04 | End: 2021-06-04

## 2021-06-04 RX ORDER — METOCLOPRAMIDE HYDROCHLORIDE 5 MG/ML
10 INJECTION INTRAMUSCULAR; INTRAVENOUS ONCE
Status: COMPLETED | OUTPATIENT
Start: 2021-06-04 | End: 2021-06-04

## 2021-06-04 RX ORDER — DIPHENHYDRAMINE HYDROCHLORIDE 50 MG/ML
25 INJECTION INTRAMUSCULAR; INTRAVENOUS ONCE
Status: COMPLETED | OUTPATIENT
Start: 2021-06-04 | End: 2021-06-04

## 2021-06-04 RX ADMIN — ONDANSETRON 4 MG: 2 INJECTION INTRAMUSCULAR; INTRAVENOUS at 23:40

## 2021-06-04 RX ADMIN — DIPHENHYDRAMINE HYDROCHLORIDE 25 MG: 50 INJECTION, SOLUTION INTRAMUSCULAR; INTRAVENOUS at 23:40

## 2021-06-04 RX ADMIN — KETOROLAC TROMETHAMINE 15 MG: 15 INJECTION, SOLUTION INTRAMUSCULAR; INTRAVENOUS at 23:40

## 2021-06-04 RX ADMIN — METOCLOPRAMIDE 10 MG: 5 INJECTION, SOLUTION INTRAMUSCULAR; INTRAVENOUS at 23:40

## 2021-06-04 ASSESSMENT — ENCOUNTER SYMPTOMS
HEADACHES: 1
FEVER: 0
SHORTNESS OF BREATH: 0
NAUSEA: 1
CHILLS: 0

## 2021-06-05 VITALS
SYSTOLIC BLOOD PRESSURE: 138 MMHG | RESPIRATION RATE: 18 BRPM | HEART RATE: 91 BPM | OXYGEN SATURATION: 100 % | DIASTOLIC BLOOD PRESSURE: 90 MMHG | TEMPERATURE: 98 F

## 2021-06-05 LAB
ANION GAP SERPL CALCULATED.3IONS-SCNC: 7 MMOL/L (ref 3–14)
BUN SERPL-MCNC: 18 MG/DL (ref 7–30)
CALCIUM SERPL-MCNC: 8.6 MG/DL (ref 8.5–10.1)
CHLORIDE SERPL-SCNC: 108 MMOL/L (ref 94–109)
CO2 SERPL-SCNC: 22 MMOL/L (ref 20–32)
CREAT SERPL-MCNC: 0.75 MG/DL (ref 0.66–1.25)
GFR SERPL CREATININE-BSD FRML MDRD: >90 ML/MIN/{1.73_M2}
GLUCOSE SERPL-MCNC: 137 MG/DL (ref 70–99)
POTASSIUM SERPL-SCNC: 3.5 MMOL/L (ref 3.4–5.3)
SODIUM SERPL-SCNC: 137 MMOL/L (ref 133–144)

## 2021-06-05 RX ORDER — KETOROLAC TROMETHAMINE 10 MG/1
10 TABLET, FILM COATED ORAL EVERY 6 HOURS PRN
Qty: 10 TABLET | Refills: 0 | Status: SHIPPED | OUTPATIENT
Start: 2021-06-05

## 2021-06-05 RX ORDER — ONDANSETRON 4 MG/1
4 TABLET, ORALLY DISINTEGRATING ORAL EVERY 6 HOURS PRN
Qty: 10 TABLET | Refills: 0 | Status: SHIPPED | OUTPATIENT
Start: 2021-06-05 | End: 2021-06-08

## 2021-06-05 RX ORDER — METOCLOPRAMIDE 10 MG/1
10 TABLET ORAL
Qty: 20 TABLET | Refills: 0 | Status: SHIPPED | OUTPATIENT
Start: 2021-06-05

## 2021-06-05 NOTE — ED TRIAGE NOTES
Patient presents to the ED complaining of a migraine.  States he took his Imitrex with no relief.  EMS gave 8mg zofran PTA

## 2021-06-05 NOTE — ED NOTES
Bed: ED23  Expected date:   Expected time:   Means of arrival:   Comments:  [id number] 71M migraine eta [id number]

## 2021-06-05 NOTE — ED PROVIDER NOTES
History   Chief Complaint:  Headache       HPI   Saad Renee is a 71 year old male with history of pulmonary embolism and migraine who presents via EMS with a headache. This morning, the patient reports that he awoke with nausea, a severe headache that felt as though it covered his entire head. He reports a recent history of neck and back pain that he has been taking Tylenol and ibuprofen for. He reports taking Imitrex this morning but states that it did not alleviate his symptoms. He states that his symptoms persisted throughout the evening, prompting him to alert EMS. He denies any chest pain, shortness of breath, fever, chills, or any other symptoms.      Review of Systems   Constitutional: Negative for chills and fever.   Respiratory: Negative for shortness of breath.    Cardiovascular: Negative for chest pain.   Gastrointestinal: Positive for nausea.   Neurological: Positive for headaches.   All other systems reviewed and are negative.      Allergies:  Atenolol  Heparin  Lisinopril     Medications:  Albuterol  Aspirin  Lipitor  Tofranil  Nitrostat  Prilosec  Senokot  Bactrim  Flomax  Topamax  Effexor  Imitrex    Past Medical History:    Acquired polyneuropathy  Onychomycosis   Dermatitis  Pulmonary embolism  Migraine      Past Surgical History:    Right hip arthroplasty  Total knee arthroplasty  Left shoulder arthroplasty  Abdominal hernia repair  Lumbar laminectomy  Bowel resection  Cervical fusion  Coronary angioplasty    Social History:  The patient presents to the emergency department via EMS.    Physical Exam     Patient Vitals for the past 24 hrs:   BP Temp Temp src Pulse Resp SpO2   06/05/21 0015 (!) 156/95 -- -- 90 -- 98 %   06/04/21 2340 (!) 163/107 -- -- 77 -- 98 %   06/04/21 2322 (!) 171/98 98  F (36.7  C) Oral 84 18 100 %       Physical Exam  HENT:      Head: Normocephalic.      Right Ear: Tympanic membrane normal.      Left Ear: Tympanic membrane normal.      Mouth/Throat:      Mouth: Mucous  membranes are moist.   Eyes:      Extraocular Movements: Extraocular movements intact.      Conjunctiva/sclera: Conjunctivae normal.      Pupils: Pupils are equal, round, and reactive to light.   Cardiovascular:      Rate and Rhythm: Normal rate.   Pulmonary:      Effort: Pulmonary effort is normal.   Skin:     General: Skin is warm.      Capillary Refill: Capillary refill takes less than 2 seconds.   Neurological:      General: No focal deficit present.      Mental Status: He is alert and oriented to person, place, and time.   Psychiatric:         Mood and Affect: Mood normal.         Emergency Department Course     Laboratory:     CBC: WBC 6.3, HGB 10.5 (L),     BMP: Glucose 137 (H), o/w WNL (Creatinine 0.75)      Emergency Department Course:    Reviewed:  I reviewed nursing notes, vitals, past medical history and care everywhere.    Assessments:  2325 I obtained history and examined the patient as noted above.     0012 I rechecked the patient. He reported that his symptoms had improved after emergency department interventions.     Interventions:  2340 Zofran 4 mg IV  2340 Toradol 15 mg IV  2340 Reglan 10 mg IV  2340 Benadryl 25 mg IV    Disposition:  The patient was discharged to home.       Impression & Plan       Medical Decision Making:  Patient presents with self-described migraine headache patient took Imitrex at home with no relief.  Pain does not seem to be significantly different than prior migraines patient is already been diagnosed with migraine.  Patient is on anticoagulation not at risk for CVT did consider hemorrhage but with reasonable medications headache seem to resolve and symptoms do not describe a sudden or severe headache.  Patient describes this is similar to prior migraines and therefore imaging was not recommended electrolytes including sodium blood sugar were normal patient was discharged in stable condition after improvement after meds in the emergency room.      Covid-19  Saad RODRIGUEZ  Víctor was evaluated during a global COVID-19 pandemic, which necessitated consideration that the patient might be at risk for infection with the SARS-CoV-2 virus that causes COVID-19.   Applicable protocols for evaluation were followed during the patient's care.     Diagnosis:    ICD-10-CM    1. Migraine without status migrainosus, not intractable, unspecified migraine type  G43.909        Discharge Medications:  New Prescriptions    KETOROLAC (TORADOL) 10 MG TABLET    Take 1 tablet (10 mg) by mouth every 6 hours as needed for moderate pain    METOCLOPRAMIDE (REGLAN) 10 MG TABLET    Take 1 tablet (10 mg) by mouth 4 times daily (before meals and nightly)    ONDANSETRON (ZOFRAN ODT) 4 MG ODT TAB    Take 1 tablet (4 mg) by mouth every 6 hours as needed for nausea or vomiting       Scribe Disclosure:  Goyo TORRES, am serving as a scribe at 11:22 PM on 6/4/2021 to document services personally performed by Rod Bella MD based on my observations and the provider's statements to me.            Rod Bella MD  06/09/21 [id number]

## 2021-12-13 NOTE — DISCHARGE INSTRUCTIONS
You have described headache similar to your prior migraines with nausea.  We have given you a nausea medication that can affect migraines.  Continue nausea medications at home return with fever or severe headache different than your prior migraines.   40